# Patient Record
Sex: MALE | Race: WHITE | NOT HISPANIC OR LATINO | Employment: FULL TIME | URBAN - METROPOLITAN AREA
[De-identification: names, ages, dates, MRNs, and addresses within clinical notes are randomized per-mention and may not be internally consistent; named-entity substitution may affect disease eponyms.]

---

## 2024-08-20 ENCOUNTER — HOSPITAL ENCOUNTER (EMERGENCY)
Facility: HOSPITAL | Age: 34
Discharge: HOME | End: 2024-08-20
Attending: EMERGENCY MEDICINE
Payer: COMMERCIAL

## 2024-08-20 VITALS
TEMPERATURE: 98.5 F | HEART RATE: 71 BPM | OXYGEN SATURATION: 99 % | DIASTOLIC BLOOD PRESSURE: 79 MMHG | WEIGHT: 230 LBS | RESPIRATION RATE: 20 BRPM | SYSTOLIC BLOOD PRESSURE: 149 MMHG

## 2024-08-20 DIAGNOSIS — M54.30 SCIATICA: ICD-10-CM

## 2024-08-20 DIAGNOSIS — M54.50 ACUTE LOW BACK PAIN: Primary | ICD-10-CM

## 2024-08-20 PROCEDURE — 99282 EMERGENCY DEPT VISIT SF MDM: CPT

## 2024-08-20 PROCEDURE — 99284 EMERGENCY DEPT VISIT MOD MDM: CPT | Performed by: EMERGENCY MEDICINE

## 2024-08-20 RX ORDER — PREDNISONE 20 MG/1
40 TABLET ORAL DAILY
Qty: 10 TABLET | Refills: 0 | Status: SHIPPED | OUTPATIENT
Start: 2024-08-20 | End: 2024-08-25

## 2024-08-20 RX ORDER — PREDNISONE 20 MG/1
60 TABLET ORAL ONCE
Status: COMPLETED | OUTPATIENT
Start: 2024-08-20 | End: 2024-08-20

## 2024-08-20 RX ADMIN — PREDNISONE 60 MG: 20 TABLET ORAL at 11:03

## 2024-08-20 NOTE — DISCHARGE INSTRUCTIONS
Rest  Heat to the area may help  Continue pain medicine as needed  Prednisone daily for the next 5 days to reduce inflammation  Follow-up with your provider or return if worse

## 2024-08-20 NOTE — ED PROVIDER NOTES
History  Chief Complaint   Patient presents with    Back Pain     Pt reports lower right back pain and travels down b/l legs. Hx of same.      HPI patient is a 33-year-old male he reports right-sided low back pain that radiates down both of his legs.  Patient was previously seen by his provider with back pain in the past told that if he had any numbness or incontinence he should come to the hospital.  Patient denies any numbness or incontinence today but reports that his back pain has returned.  He reports that he was told he may have a herniated disc.  Describes a fairly sharp right-sided low back pain which radiates down to his right leg and also travels across to the left leg.  He denies any focal weakness.  He denies any difficulty ambulating.  Patient was concerned about herniated disc so came to the emergency department.  Past history of back problems  Family history noncontributory  Social history, non-smoker no history of drug abuse    None       History reviewed. No pertinent past medical history.    History reviewed. No pertinent surgical history.    History reviewed. No pertinent family history.  I have reviewed and agree with the history as documented.    E-Cigarette/Vaping    E-Cigarette Use Never User      E-Cigarette/Vaping Substances     Social History     Tobacco Use    Smoking status: Never    Smokeless tobacco: Never   Vaping Use    Vaping status: Never Used   Substance Use Topics    Alcohol use: Never    Drug use: Never       Review of Systems   Constitutional:  Negative for fever.   HENT:  Negative for congestion.    Eyes:  Negative for pain and redness.   Respiratory:  Negative for cough and shortness of breath.    Cardiovascular:  Negative for chest pain.   Gastrointestinal:  Negative for abdominal pain and vomiting.   Genitourinary:  Negative for frequency.   Musculoskeletal:  Positive for back pain. Negative for gait problem.       Physical Exam  Physical Exam  Vitals and nursing note  reviewed.   Constitutional:       Appearance: He is well-developed.   HENT:      Head: Normocephalic.      Right Ear: External ear normal.      Left Ear: External ear normal.      Nose: Nose normal.      Mouth/Throat:      Mouth: Mucous membranes are moist.      Pharynx: Oropharynx is clear.   Eyes:      General: Lids are normal.      Extraocular Movements: Extraocular movements intact.      Pupils: Pupils are equal, round, and reactive to light.   Pulmonary:      Effort: Pulmonary effort is normal. No respiratory distress.   Musculoskeletal:         General: No deformity. Normal range of motion.      Cervical back: Normal range of motion and neck supple.      Comments: There is some right-sided low back tenderness, there is pain with right-sided straight leg raise, there is pain in the right low back with left-sided straight leg raise, no focal weakness observe the patient ambulate he ambulates normally.   Skin:     General: Skin is warm and dry.   Neurological:      Mental Status: He is alert and oriented to person, place, and time.   Psychiatric:         Mood and Affect: Mood normal.         Vital Signs  ED Triage Vitals [08/20/24 0945]   Temperature Pulse Respirations Blood Pressure SpO2   98.5 °F (36.9 °C) 71 20 149/79 99 %      Temp Source Heart Rate Source Patient Position - Orthostatic VS BP Location FiO2 (%)   Oral Monitor Sitting Left arm --      Pain Score       --           Vitals:    08/20/24 0945   BP: 149/79   Pulse: 71   Patient Position - Orthostatic VS: Sitting         Visual Acuity      ED Medications  Medications   predniSONE tablet 60 mg (60 mg Oral Given 8/20/24 1103)       Diagnostic Studies  Results Reviewed       None                   No orders to display              Procedures  Procedures         ED Course         Patient meets Back pain low risk criteria, no trauma, no fever chills or weight loss, no neurological deficit, no history of cancer, no history of injecting drugs, pain improved  with rest.                         SBIRT 20yo+      Flowsheet Row Most Recent Value   Initial Alcohol Screen: US AUDIT-C     1. How often do you have a drink containing alcohol? 0 Filed at: 08/20/2024 0946   2. How many drinks containing alcohol do you have on a typical day you are drinking?  0 Filed at: 08/20/2024 0946   3a. Male UNDER 65: How often do you have five or more drinks on one occasion? 0 Filed at: 08/20/2024 0946   3b. FEMALE Any Age, or MALE 65+: How often do you have 4 or more drinks on one occassion? 0 Filed at: 08/20/2024 0946   Audit-C Score 0 Filed at: 08/20/2024 0946   CHANELLE: How many times in the past year have you...    Used an illegal drug or used a prescription medication for non-medical reasons? Never Filed at: 08/20/2024 0946                      Medical Decision Making  Medical decision making 33-year-old male presents emergency department with right-sided low back pain radiating down his right leg worse with palpation and with movement most consistent with low back pain musculoskeletal possibly herniated disc.  Discussed with patient options are to reduce inflammation we discussed the risk benefit of prednisone he wanted to try some prednisone to reduce inflammation around his disc.  Patient long-term lives out of the area will follow-up with his provider at home.  We discussed indications to return.  Low risk by back pain criteria    Risk  Prescription drug management.                 Disposition  Final diagnoses:   Acute low back pain   Sciatica     Time reflects when diagnosis was documented in both MDM as applicable and the Disposition within this note       Time User Action Codes Description Comment    8/20/2024 10:59 AM Jersno Marie Add [M54.50] Acute low back pain     8/20/2024 10:59 AM Jerson Marie Add [M54.30] Sciatica           ED Disposition       ED Disposition   Discharge    Condition   Stable    Date/Time   Tue Aug 20, 2024 1059    Comment   [place] discharge to  home/self care.                   Follow-up Information    None         Discharge Medication List as of 8/20/2024 11:00 AM        START taking these medications    Details   predniSONE 20 mg tablet Take 2 tablets (40 mg total) by mouth daily for 5 days, Starting Tue 8/20/2024, Until Sun 8/25/2024, Normal             No discharge procedures on file.    PDMP Review       None            ED Provider  Electronically Signed by             Jerson Marie MD  08/21/24 8197